# Patient Record
Sex: FEMALE | Race: WHITE | NOT HISPANIC OR LATINO | URBAN - METROPOLITAN AREA
[De-identification: names, ages, dates, MRNs, and addresses within clinical notes are randomized per-mention and may not be internally consistent; named-entity substitution may affect disease eponyms.]

---

## 2018-10-26 ENCOUNTER — HOSPITAL ENCOUNTER (EMERGENCY)
Facility: HOSPITAL | Age: 78
Discharge: HOME | End: 2018-10-26
Attending: EMERGENCY MEDICINE
Payer: MEDICARE

## 2018-10-26 VITALS
BODY MASS INDEX: 28.7 KG/M2 | SYSTOLIC BLOOD PRESSURE: 167 MMHG | RESPIRATION RATE: 20 BRPM | DIASTOLIC BLOOD PRESSURE: 79 MMHG | OXYGEN SATURATION: 96 % | HEIGHT: 63 IN | TEMPERATURE: 98 F | WEIGHT: 162 LBS | HEART RATE: 112 BPM

## 2018-10-26 DIAGNOSIS — S61.419A SKIN TEAR OF HAND WITHOUT COMPLICATION, INITIAL ENCOUNTER: Primary | ICD-10-CM

## 2018-10-26 PROCEDURE — 99283 EMERGENCY DEPT VISIT LOW MDM: CPT | Mod: 25

## 2018-10-26 PROCEDURE — 25000003 PHARM REV CODE 250: Performed by: EMERGENCY MEDICINE

## 2018-10-26 RX ORDER — MUPIROCIN 20 MG/G
1 OINTMENT TOPICAL
Status: COMPLETED | OUTPATIENT
Start: 2018-10-26 | End: 2018-10-26

## 2018-10-26 RX ORDER — ALPRAZOLAM 0.25 MG/1
0.5 TABLET ORAL
Status: DISCONTINUED | OUTPATIENT
Start: 2018-10-27 | End: 2018-10-27 | Stop reason: HOSPADM

## 2018-10-26 RX ORDER — MUPIROCIN 20 MG/G
OINTMENT TOPICAL 3 TIMES DAILY
Qty: 30 G | Refills: 0 | Status: SHIPPED | OUTPATIENT
Start: 2018-10-26

## 2018-10-26 RX ADMIN — MUPIROCIN 22 G: 20 OINTMENT TOPICAL at 11:10

## 2018-10-27 NOTE — ED PROVIDER NOTES
Encounter Date: 10/26/2018    SCRIBE #1 NOTE: I, Sreekanth Ellis, am scribing for, and in the presence of, Dr. Orona.       History     Chief Complaint   Patient presents with    Skin tear     from trying to move  in cardiac arrest     Anxiety     spouse  tonight        Time seen by provider: 10:42 PM on 10/26/2018    Kalina Trent is a 78 y.o. female who presents to the ED with complaints of two skin tears on the top of her right hand secondary to trying to move her  while he was in cardiac arrest earlier tonight. Pt adds that she bruises easily. The patients daughter states that she is concerned due to the pt having skin cancer. Patients spouse did pass away tonight. Pt has drug allergies to morpholine analogues. No pertinent surgical hx noted. The patient denies any other symptoms at this time.       The history is provided by the patient and a relative.     Review of patient's allergies indicates:   Allergen Reactions    Morpholine analogues      No past medical history on file.  No past surgical history on file.  No family history on file.  Social History     Tobacco Use    Smoking status: Not on file   Substance Use Topics    Alcohol use: Not on file    Drug use: Not on file     Review of Systems   Constitutional: Negative for fever.   HENT: Negative for sore throat.    Respiratory: Negative for shortness of breath.    Cardiovascular: Negative for chest pain.   Gastrointestinal: Negative for nausea.   Genitourinary: Negative for dysuria.   Musculoskeletal: Negative for back pain.   Skin: Positive for wound (Skin tear on the right hand.). Negative for rash.   Neurological: Negative for weakness.   Hematological: Bruises/bleeds easily.       Physical Exam     Initial Vitals [10/26/18 2240]   BP Pulse Resp Temp SpO2   (!) 167/79 (!) 112 20 97.8 °F (36.6 °C) 96 %      MAP       --         Physical Exam    Nursing note and vitals reviewed.  Constitutional: She appears well-developed  and well-nourished. She is not diaphoretic. No distress.   HENT:   Head: Normocephalic and atraumatic.   Eyes: EOM are normal. Pupils are equal, round, and reactive to light.   Neck: Normal range of motion. Neck supple.   Cardiovascular: Normal rate, regular rhythm, normal heart sounds and intact distal pulses. Exam reveals no gallop and no friction rub.    No murmur heard.  Pulmonary/Chest: Breath sounds normal. No respiratory distress. She has no wheezes. She has no rhonchi. She has no rales.   Abdominal: Soft. Bowel sounds are normal. There is no tenderness. There is no rebound and no guarding.   Musculoskeletal: Normal range of motion.   Neurological: She is alert and oriented to person, place, and time.   Skin: Skin is warm and dry. Bruising noted.   Right hand has 2 small skin tares to the dorsal surface with some bruising.    Psychiatric: She has a normal mood and affect. Her behavior is normal. Judgment and thought content normal.         ED Course   Procedures  Labs Reviewed - No data to display       Imaging Results          X-Ray Hand 3 View Right (In process)                  Medical Decision Making:   History:   Old Medical Records: I decided to obtain old medical records.  Initial Assessment:   78-year-old female presented with a chief complaint of a hand injury.  Differential Diagnosis:   Initial differential diagnosis included but not limited to skin tear, fracture, and contusion.    Clinical Tests:   Radiological Study: Ordered and Reviewed  ED Management:  The patient was emergently evaluated in the emergency department, her evaluation was significant for an elderly female with multiple skin tears and ecchymosis noted to the right hand.  The patient's x-ray shows no acute abnormalities per my independent interpretation.  The patient's diagnosis is likely a hand contusion and skin tears.  The patient did get localized wound care applied to the site and tolerated the procedure well. The patient is  stable for discharge to home.  Additionally the patient was given a dose of p.o. Xanax here in the emergency department, as her  did have a cardiac arrest  after their accident.  The patient is discharged home with her daughter and she is to follow up with her PCP for further care.            Scribe Attestation:   Scribe #1: I performed the above scribed service and the documentation accurately describes the services I performed. I attest to the accuracy of the note.        I, Dr. Jose Antonio Orona, personally performed the services described in this documentation. All medical record entries made by the scribe were at my direction and in my presence.  I have reviewed the chart and agree that the record reflects my personal performance and is accurate and complete. Jose Antonio Orona MD.  6:49 AM 10/27/2018          Clinical Impression:   The encounter diagnosis was Skin tear of hand without complication, initial encounter.      Disposition:   Disposition: Discharged  Condition: Stable                        Jose Antonio Orona MD  10/27/18 0652